# Patient Record
Sex: FEMALE | Race: BLACK OR AFRICAN AMERICAN | NOT HISPANIC OR LATINO | Employment: OTHER | URBAN - METROPOLITAN AREA
[De-identification: names, ages, dates, MRNs, and addresses within clinical notes are randomized per-mention and may not be internally consistent; named-entity substitution may affect disease eponyms.]

---

## 2017-02-15 ENCOUNTER — TRANSCRIBE ORDERS (OUTPATIENT)
Dept: ADMINISTRATIVE | Facility: HOSPITAL | Age: 67
End: 2017-02-15

## 2017-02-15 DIAGNOSIS — R13.12 OROPHARYNGEAL DYSPHAGIA: Primary | ICD-10-CM

## 2017-02-17 ENCOUNTER — HOSPITAL ENCOUNTER (OUTPATIENT)
Dept: GENERAL RADIOLOGY | Facility: HOSPITAL | Age: 67
Discharge: HOME | End: 2017-02-17
Admitting: NURSE PRACTITIONER

## 2017-02-17 ENCOUNTER — HOSPITAL ENCOUNTER (OUTPATIENT)
Dept: GENERAL RADIOLOGY | Facility: HOSPITAL | Age: 67
Discharge: HOME | End: 2017-02-17

## 2017-02-17 DIAGNOSIS — R13.12 OROPHARYNGEAL DYSPHAGIA: ICD-10-CM

## 2017-02-17 PROCEDURE — G8998 SWALLOW D/C STATUS: HCPCS

## 2017-02-17 PROCEDURE — G8996 SWALLOW CURRENT STATUS: HCPCS

## 2017-02-17 PROCEDURE — 92611 MOTION FLUOROSCOPY/SWALLOW: CPT

## 2017-02-17 PROCEDURE — 74240 X-RAY XM UPR GI TRC 1CNTRST: CPT

## 2017-02-17 PROCEDURE — G8997 SWALLOW GOAL STATUS: HCPCS

## 2017-02-17 PROCEDURE — 74230 X-RAY XM SWLNG FUNCJ C+: CPT

## 2017-02-17 NOTE — PROGRESS NOTES
"Outpatient Speech Language Pathology   Adult Swallow Initial Evaluation  [facility]   Modified Barium Swallow Study (MBS)     Patient Name: Maria Teresa Delarosa  : 1950  MRN: [id number]  Today's Date: 2017         Visit Date: 2017     Visit Dx:     ICD-10-CM ICD-9-CM   1. Oropharyngeal dysphagia R13.12 787.22           Adult Dysphagia - 17 1010     Adult Dysphagia Background    Patient Description of Complaint Patient reported recent difficulty swallowing food.  -AS    Date of Onset Over the last week, per patient  -AS    Symptoms Reported by Patient Difficulty swallowing solids  -AS    History Pertinent to Diagnosis Patient is currently being treated for dysphagia at [facility]. She reported increased difficulty swallowing over the last week. Patient denies having undergoing instrumental swallow study in the past. Past medical history included: UTI, DM2 w/ diabetic neuropathy, \"reflux\" per pt though she indicated that she had not been diagnosed by MD, just reports symptoms.   -AS    Current Diet (Solids) Mechanical Soft  -AS    Diet Level (Liquids) Thin Liquid  -AS    Oral Mech    Dentition Patient is edentulous;Dentures were not worn for this evaluation;Other (comment)   pt reported she occasional wears dentures when eating  -AS    VFSS Exam    Study Completed By SLP and Radiology PA (comment)  -AS    Textures Presented Thin;Nectar;Pudding;Solid  -AS    Position During Study/Views Obtained Upright;Lateral View  -AS    Physiologic Impairments Noted on VFSS    Summary Patient presented with mild oral dysphagia characterized by increased oral prep with cracker coated in pudding barium secondary to possible lingual weakness, but also likely affected by edentition. Pharyngeal phase of the swallow was WFL. No penetration/aspiration with any consistency trialed. There was no significant pharyngeal residue.   Radiology PA scanned esophageal phase and reported mild esophageal reflux. Please " see radiology report for further details.  -AS    Symptoms    Aspiration None noted  -AS    Residue Noted No significant residue  -AS    Compensatory Strategies None needed  -AS    Esophageal Phase    Per Radiology, the esophageal phase: X  -AS    Was characterized by esophageal dismotility Gastroesophageal reflux  -AS    MBSImP Score    MBSImP Score Completed? Yes  -AS    Materials presented per Standard Protocol? No  -AS    How was standard protocol modifed? Honey omitted;A-P not obtained  -AS    Oral Impairment Domain    Component 1- Lip Closure 0: No labial escape  -AS    Component 2- Tongue Control During Bolus Hold 1: Escape to lateral buccal cavity/floor of mouth (FOM)  -AS    Component 3- Bolus Prep/Mastication 1: Slow prolonger chewing/mashing with complete re-collection  -AS    Component 4- Bolus Transport/Lingual Motion 0: Brisk tongue motion  -AS    Component 5- Oral Residue 1: Trace residue lining oral structures  -AS    Component 5 Location C: Tongue  -AS    Component 6- Initiation of Pharyngeal Swallow 3: Bolus head in pyriforms  -AS    Pharyngeal Impairment Domain    Component 7- Soft Palate Elevation 0: No bolus between soft palate (SP)/pharyngeal wall (PW)  -AS    Component 8- Laryngeal Elevation 0: Complete superior movement of thyroid cartilage with complete approximation of arytenoids to epiglottic petiole  -AS    Component 9- Anterior Hyoid Excursion 0: Complete anterior movement  -AS    Component 10- Epiglottic Movement 0: Complete inversion  -AS    Component 11- Laryngeal Vestibular Closure- Height of Swallow 0: Complete- no air/contrast in laryngeal vestibule  -AS    Component 12- Pharyngeal Stripping Wave 0: Present- complete  -AS    Component 13- Pharyngeal Contraction (A/P View Only) --   DNT  -AS    Component 14- PE Segment Opening 0: Complete distension and complete duration- no obstruction of flow  -AS    Component 15- Tongue Base Retraction 1: Trace column of contrast or air between  TB and PW  -AS    Component 16- Pharyngeal Residue 1: Trace residue within or on pharyngeal structures  -AS    Component 16 Location Diffuse (>3 areas)  -AS    Esophageal Impairment Domain    Component 17- Esophageal Clearance (Upright Position) 2: Esophageal retention with retrograde flow below pharyngoesophageal segment (PES)  -AS    Results/Recs/Barriers/Education for Dysphagia    Recommendations for Diet/Nutirition full oral diet  -AS      User Key  (r) = Recorded By, (t) = Taken By, (c) = Cosigned By    Initials Name Provider Type    AS Светлана Toney MS CCC-SLP Speech and Language Pathologist        Recommendations:  1) Mechanical soft diet, as tolerated and per treating SLP  2) Thin liquids  3) General aspiration precautions and reflux precautions  4) Medications administered with thin liquids or pureed consistencies, as tolerated  5) Oral care BID & PRN  6) Continue dysphagia tx, targeting oral phase and swallow precautions  7) May consider further medical work-up/management of suspected esophageal dysfunction          OP SLP Education       02/17/17 1133          Education    Barriers to Learning No barriers identified  -AS    Education Provided Described results of evaluation;Patient expressed understanding of evaluation  -AS    Assessed Learning needs;Learning motivation;Learning preferences;Learning readiness  -AS    Learning Motivation Strong  -AS    Learning Method Explanation  -AS    Teaching Response Verbalized understanding  -AS      User Key  (r) = Recorded By, (t) = Taken By, (c) = Cosigned By    Initials Name Effective Dates    AS Светлана Toney MS CCC-SLP 06/22/15 -               OP SLP Assessment/Plan - 02/17/17 1132     SLP Assessment    Functional Problems Swallowing  -AS    Clinical Impression: Swallowing Mild:;oral phase dysphagia   Functional pharyngeal phase, suspect esophageal dysfunction  -AS    Prognosis Good (comment)  -AS    Patient would benefit from skilled therapy  intervention Yes  -AS      User Key  (r) = Recorded By, (t) = Taken By, (c) = Cosigned By    Initials Name Provider Type    AS Светлана Toney MS CCC-SLP Speech and Language Pathologist            SLP Outcome Measures (last 72 hours)      SLP Outcome Measures       02/17/17 1100          SLP Outcome Measures    Outcome Measure Used? Adult NOMS  -AS    FCM Scores    FCM Chosen Swallowing  -AS    Swallowing FCM Score 6  -AS      User Key  (r) = Recorded By, (t) = Taken By, (c) = Cosigned By    Initials Name Effective Dates    AS Светлана Toney MS CCC-SLP 06/22/15 -         Time Calculation:   SLP Start Time: 1010    Therapy Charges for Today     Code Description Service Date Service Provider Modifiers Qty    05995766581 HC ST SWALLOWING CURRENT STATUS 2/17/2017 Светлана Toney MS CCC-SLP GN, CI 1    59666009211 HC ST SWALLOWING PROJECTED 2/17/2017 MS ANGELO Guardaod GN, CI 1    [id number] HC ST SWALLOWING DISCHARGE 2/17/2017 MS ANGELO Guardado GN, CI 1    [id number] HC ST MOTION FLUORO EVAL SWALLOW 7 2/17/2017 MS ANGELO Guardado GN 1        SLP G-Codes  SLP NOMS Used?: Yes  Functional Limitations: Swallowing  Swallow Current Status (): At least 1 percent but less than 20 percent impaired, limited or restricted  Swallow Goal Status (): At least 1 percent but less than 20 percent impaired, limited or restricted  Swallow Discharge Status (): At least 1 percent but less than 20 percent impaired, limited or restricted        MS ANGELO Canales  2/17/2017